# Patient Record
Sex: FEMALE | URBAN - METROPOLITAN AREA
[De-identification: names, ages, dates, MRNs, and addresses within clinical notes are randomized per-mention and may not be internally consistent; named-entity substitution may affect disease eponyms.]

---

## 2020-02-19 ENCOUNTER — TELEPHONE (OUTPATIENT)
Dept: FAMILY MEDICINE CLINIC | Facility: CLINIC | Age: 35
End: 2020-02-19

## 2020-02-19 NOTE — TELEPHONE ENCOUNTER
Pt schedule appt on [place] for \"heart pain\" on 2/20/20 at 7pm with [facility] [place]    Left message to triage call

## 2020-02-20 ENCOUNTER — OFFICE VISIT (OUTPATIENT)
Dept: FAMILY MEDICINE CLINIC | Facility: CLINIC | Age: 35
End: 2020-02-20
Payer: COMMERCIAL

## 2020-02-20 VITALS
HEART RATE: 70 BPM | WEIGHT: 134 LBS | DIASTOLIC BLOOD PRESSURE: 64 MMHG | RESPIRATION RATE: 18 BRPM | HEIGHT: 60 IN | SYSTOLIC BLOOD PRESSURE: 122 MMHG | BODY MASS INDEX: 26.31 KG/M2 | OXYGEN SATURATION: 98 %

## 2020-02-20 DIAGNOSIS — R20.2 SENSATION OF SKIN CRAWLING: ICD-10-CM

## 2020-02-20 DIAGNOSIS — M54.2 NECK PAIN: ICD-10-CM

## 2020-02-20 DIAGNOSIS — R07.89 CHEST DISCOMFORT: ICD-10-CM

## 2020-02-20 DIAGNOSIS — H53.9 VISION CHANGES: Primary | ICD-10-CM

## 2020-02-20 PROCEDURE — 99205 OFFICE O/P NEW HI 60 MIN: CPT | Performed by: PHYSICIAN ASSISTANT

## 2020-02-21 NOTE — PROGRESS NOTES
Nathan Nicholas  is a 28year old female. No chief complaint on file. HPI:   Patient here to discuss a few concerns. New to the clinic. Recently moved from [place] 9 months ago.      Feels she has \"worms\"  For 3 months she feels something is mov color, and discharge. MUSK:+neck pain  NEURO: Denies numbness/tingling, weakness, and headaches  ENDO: Denies polyuria, polydipsia, and tremors.   ALLERGY/ASTHMA: Denies asthma and environmental allergies  HEME: Denies anemia, abnormal bleeding, and easy b from poor posture in sitting at a computer daily. Check baseline C-spine x-ray. Recommend heat application, gentle stretching, and Tylenol or ibuprofen over-the-counter.  - XR CERVICAL SPINE (2 VIEWS) (CPT=28496); Future    4.  Sensation of skin crawling

## (undated) NOTE — LETTER
07/30/20        Ora Diehl   [address]      Dear Oscar Lewis,    [address] records indicate that you have outstanding lab work and or testing that was ordered for you and has not yet been completed:  Orders Placed This Encoun